# Patient Record
Sex: FEMALE | Race: WHITE | NOT HISPANIC OR LATINO | Employment: FULL TIME | URBAN - METROPOLITAN AREA
[De-identification: names, ages, dates, MRNs, and addresses within clinical notes are randomized per-mention and may not be internally consistent; named-entity substitution may affect disease eponyms.]

---

## 2019-03-01 ENCOUNTER — APPOINTMENT (OUTPATIENT)
Dept: CARDIOLOGY | Facility: HOSPITAL | Age: 49
End: 2019-03-01

## 2019-03-01 ENCOUNTER — HOSPITAL ENCOUNTER (EMERGENCY)
Facility: HOSPITAL | Age: 49
Discharge: HOME | End: 2019-03-01
Attending: EMERGENCY MEDICINE | Admitting: EMERGENCY MEDICINE

## 2019-03-01 VITALS
HEIGHT: 70 IN | HEART RATE: 83 BPM | SYSTOLIC BLOOD PRESSURE: 136 MMHG | DIASTOLIC BLOOD PRESSURE: 98 MMHG | OXYGEN SATURATION: 98 % | BODY MASS INDEX: 34.36 KG/M2 | WEIGHT: 240 LBS | RESPIRATION RATE: 16 BRPM | TEMPERATURE: 97.5 F

## 2019-03-01 DIAGNOSIS — I83.92 VARICOSE VEINS OF LEFT LOWER EXTREMITY, UNSPECIFIED WHETHER COMPLICATED: Primary | ICD-10-CM

## 2019-03-01 LAB
BASOPHILS # BLD AUTO: 0.06 10*3/MM3 (ref 0–0.2)
BASOPHILS NFR BLD AUTO: 0.8 % (ref 0–1)
BH CV LOW VAS LEFT GASTRONEMIUS VESSEL: 1
BH CV LOWER VASCULAR LEFT COMMON FEMORAL AUGMENT: NORMAL
BH CV LOWER VASCULAR LEFT COMMON FEMORAL COMPRESS: NORMAL
BH CV LOWER VASCULAR LEFT COMMON FEMORAL PHASIC: NORMAL
BH CV LOWER VASCULAR LEFT COMMON FEMORAL SPONT: NORMAL
BH CV LOWER VASCULAR LEFT DISTAL FEMORAL AUGMENT: NORMAL
BH CV LOWER VASCULAR LEFT DISTAL FEMORAL COMPRESS: NORMAL
BH CV LOWER VASCULAR LEFT DISTAL FEMORAL PHASIC: NORMAL
BH CV LOWER VASCULAR LEFT GASTRONEMIUS COMPRESS: NORMAL
BH CV LOWER VASCULAR LEFT GREATER SAPH AK COMPRESS: NORMAL
BH CV LOWER VASCULAR LEFT GREATER SAPH BK COMPRESS: NORMAL
BH CV LOWER VASCULAR LEFT LESSER SAPH COMPRESS: NORMAL
BH CV LOWER VASCULAR LEFT MID FEMORAL AUGMENT: NORMAL
BH CV LOWER VASCULAR LEFT MID FEMORAL COMPRESS: NORMAL
BH CV LOWER VASCULAR LEFT MID FEMORAL PHASIC: NORMAL
BH CV LOWER VASCULAR LEFT MID FEMORAL SPONT: NORMAL
BH CV LOWER VASCULAR LEFT POPLITEAL AUGMENT: NORMAL
BH CV LOWER VASCULAR LEFT POPLITEAL COMPRESS: NORMAL
BH CV LOWER VASCULAR LEFT POPLITEAL PHASIC: NORMAL
BH CV LOWER VASCULAR LEFT POPLITEAL SPONT: NORMAL
BH CV LOWER VASCULAR LEFT POSTERIOR TIBIAL AUGMENT: NORMAL
BH CV LOWER VASCULAR LEFT PROFUNDA FEMORAL AUGMENT: NORMAL
BH CV LOWER VASCULAR LEFT PROFUNDA FEMORAL COMPRESS: NORMAL
BH CV LOWER VASCULAR LEFT PROFUNDA FEMORAL PHASIC: NORMAL
BH CV LOWER VASCULAR LEFT PROFUNDA FEMORAL SPONT: NORMAL
BH CV LOWER VASCULAR LEFT PROXIMAL FEMORAL AUGMENT: NORMAL
BH CV LOWER VASCULAR LEFT PROXIMAL FEMORAL COMPRESS: NORMAL
BH CV LOWER VASCULAR LEFT PROXIMAL FEMORAL PHASIC: NORMAL
BH CV LOWER VASCULAR LEFT PROXIMAL FEMORAL SPONT: NORMAL
BH CV LOWER VASCULAR LEFT SAPHENOFEMORAL JUNCTION COMPRESS: NORMAL
BH CV LOWER VASCULAR LEFT SAPHENOFEMORAL JUNCTION SPONT: NORMAL
BH CV LOWER VASCULAR RIGHT COMMON FEMORAL AUGMENT: NORMAL
BH CV LOWER VASCULAR RIGHT COMMON FEMORAL COMPRESS: NORMAL
BH CV LOWER VASCULAR RIGHT COMMON FEMORAL PHASIC: NORMAL
BH CV LOWER VASCULAR RIGHT COMMON FEMORAL SPONT: NORMAL
DEPRECATED RDW RBC AUTO: 46.6 FL (ref 37–54)
EOSINOPHIL # BLD AUTO: 0.62 10*3/MM3 (ref 0–0.3)
EOSINOPHIL NFR BLD AUTO: 8.2 % (ref 0–3)
ERYTHROCYTE [DISTWIDTH] IN BLOOD BY AUTOMATED COUNT: 14.3 % (ref 11.3–14.5)
HCT VFR BLD AUTO: 36.9 % (ref 34.5–44)
HGB BLD-MCNC: 11.7 G/DL (ref 11.5–15.5)
IMM GRANULOCYTES # BLD AUTO: 0.02 10*3/MM3 (ref 0–0.05)
IMM GRANULOCYTES NFR BLD AUTO: 0.3 % (ref 0–0.6)
LYMPHOCYTES # BLD AUTO: 1.48 10*3/MM3 (ref 0.6–4.8)
LYMPHOCYTES NFR BLD AUTO: 19.5 % (ref 24–44)
MCH RBC QN AUTO: 28.4 PG (ref 27–31)
MCHC RBC AUTO-ENTMCNC: 31.7 G/DL (ref 32–36)
MCV RBC AUTO: 89.6 FL (ref 80–99)
MONOCYTES # BLD AUTO: 1.06 10*3/MM3 (ref 0–1)
MONOCYTES NFR BLD AUTO: 13.9 % (ref 0–12)
NEUTROPHILS # BLD AUTO: 4.38 10*3/MM3 (ref 1.5–8.3)
NEUTROPHILS NFR BLD AUTO: 57.6 % (ref 41–71)
PLATELET # BLD AUTO: 205 10*3/MM3 (ref 150–450)
PMV BLD AUTO: 11.4 FL (ref 6–12)
RBC # BLD AUTO: 4.12 10*6/MM3 (ref 3.89–5.14)
WBC NRBC COR # BLD: 7.6 10*3/MM3 (ref 3.5–10.8)

## 2019-03-01 PROCEDURE — 93971 EXTREMITY STUDY: CPT

## 2019-03-01 PROCEDURE — 93971 EXTREMITY STUDY: CPT | Performed by: INTERNAL MEDICINE

## 2019-03-01 PROCEDURE — 99283 EMERGENCY DEPT VISIT LOW MDM: CPT

## 2019-03-01 PROCEDURE — 85025 COMPLETE CBC W/AUTO DIFF WBC: CPT | Performed by: EMERGENCY MEDICINE

## 2019-03-01 RX ORDER — CYCLOBENZAPRINE HCL 10 MG
10 TABLET ORAL 3 TIMES DAILY PRN
COMMUNITY

## 2019-03-01 NOTE — DISCHARGE INSTRUCTIONS
Keep area bandaged until tomorrow.  When you pull the bandage off, make sure that the dressing is wet before you do so.  You may use Gelfoam and wrapping as needed if the area bleeds through.  You will need the suture removed in 7 days.  Return if continued bleeding despite the above-stated measures or symptoms worsen.

## 2019-03-01 NOTE — ED PROVIDER NOTES
Subjective   48-year-old white female complaining of bleeding left leg.  Patient states that she is not sure if she injured it on the pedal the bike several days ago or she cut herself shaving but she noticed a red spot that she picked at and it started bleeding today.  She states that it continued to soaked several bandages and has continued to bleed.  It will stop with pressure however.  She has no other complaints or concerns and states that she is never had bleeding issues in the past.  She states she is also had a little bit of pain in the proximal calf area where she thinks she may have injured herself on a bicycle.  She denies any history of blood clots, chest pain, shortness of air, or other complaints.        History provided by:  Patient  Lower Extremity Issue   Location:  Leg  Leg location:  L leg  Pain details:     Quality:  Dull (Bleeding)    Radiates to:  Does not radiate    Severity:  Mild    Onset quality:  Gradual    Timing:  Constant    Progression:  Unable to specify  Chronicity:  New  Tetanus status:  Up to date  Prior injury to area:  Yes  Relieved by: Bleeding-pressure.  Worsened by:  Nothing  Associated symptoms: no fever, no muscle weakness, no numbness and no tingling        Review of Systems   Constitutional: Negative for fever.   All other systems reviewed and are negative.      History reviewed. No pertinent past medical history.    Allergies   Allergen Reactions   • Mobic [Meloxicam] Rash       Past Surgical History:   Procedure Laterality Date   • ABDOMINOPLASTY         History reviewed. No pertinent family history.    Social History     Socioeconomic History   • Marital status: Single     Spouse name: Not on file   • Number of children: Not on file   • Years of education: Not on file   • Highest education level: Not on file   Tobacco Use   • Smoking status: Never Smoker   • Smokeless tobacco: Never Used   Substance and Sexual Activity   • Alcohol use: No     Frequency: Never            Objective   Physical Exam   Constitutional: She appears well-developed and well-nourished.   HENT:   Head: Atraumatic.   Pulmonary/Chest: Effort normal.   Musculoskeletal:   Lower extremities: Left lower extremity: At the medial aspect of the left lower extremity at the distal one third area there is a varicose vein.  Superficially, there is a small opening with no active bleeding.  Neurovascular since is intact.  There is mild calf tenderness at the proximal calf posteriorly.  Pulses 4+ at DP.  The remainder of the orthopedic exam is normal.   Neurological: She is alert.   Skin: Skin is warm and dry. Capillary refill takes less than 2 seconds. No rash noted. No erythema.   Psychiatric: She has a normal mood and affect. Her behavior is normal.   Nursing note and vitals reviewed.      Laceration Repair  Date/Time: 3/1/2019 3:24 PM  Performed by: Laci Cronin PA  Authorized by: Robi Julio MD     Consent:     Consent obtained:  Verbal    Consent given by:  Patient    Alternatives discussed:  No treatment  Anesthesia (see MAR for exact dosages):     Anesthesia method:  None  Laceration details:     Length (cm):  0.3  Repair type:     Repair type:  Simple  Pre-procedure details:     Preparation:  Patient was prepped and draped in usual sterile fashion  Exploration:     Wound extent: nerve damage and tendon damage      Tendon damage location:  Lower extremity  Treatment:     Area cleansed with:  Betadine    Amount of cleaning:  Standard  Skin repair:     Repair method:  Sutures    Suture size:  4-0    Suture technique:  Simple interrupted    Number of sutures:  1  Approximation:     Approximation:  Close    Vermilion border: well-aligned    Post-procedure details:     Patient tolerance of procedure:  Tolerated well, no immediate complications               ED Course  ED Course as of Mar 01 1526   Fri Mar 01, 2019   1502 Patient with no other complaints or concerns.  [JI]      ED Course User Index  [JI]  Laci Cronin PA        Recent Results (from the past 24 hour(s))   CBC Auto Differential    Collection Time: 03/01/19  1:24 PM   Result Value Ref Range    WBC 7.60 3.50 - 10.80 10*3/mm3    RBC 4.12 3.89 - 5.14 10*6/mm3    Hemoglobin 11.7 11.5 - 15.5 g/dL    Hematocrit 36.9 34.5 - 44.0 %    MCV 89.6 80.0 - 99.0 fL    MCH 28.4 27.0 - 31.0 pg    MCHC 31.7 (L) 32.0 - 36.0 g/dL    RDW 14.3 11.3 - 14.5 %    RDW-SD 46.6 37.0 - 54.0 fl    MPV 11.4 6.0 - 12.0 fL    Platelets 205 150 - 450 10*3/mm3    Neutrophil % 57.6 41.0 - 71.0 %    Lymphocyte % 19.5 (L) 24.0 - 44.0 %    Monocyte % 13.9 (H) 0.0 - 12.0 %    Eosinophil % 8.2 (H) 0.0 - 3.0 %    Basophil % 0.8 0.0 - 1.0 %    Immature Grans % 0.3 0.0 - 0.6 %    Neutrophils, Absolute 4.38 1.50 - 8.30 10*3/mm3    Lymphocytes, Absolute 1.48 0.60 - 4.80 10*3/mm3    Monocytes, Absolute 1.06 (H) 0.00 - 1.00 10*3/mm3    Eosinophils, Absolute 0.62 (H) 0.00 - 0.30 10*3/mm3    Basophils, Absolute 0.06 0.00 - 0.20 10*3/mm3    Immature Grans, Absolute 0.02 0.00 - 0.05 10*3/mm3   Duplex Venous Lower Extremity - Left CAR    Collection Time: 03/01/19  2:24 PM   Result Value Ref Range    Right Common Femoral Spont Y     Right Common Femoral Phasic Y     Right Common Femoral Augment Y     Right Common Femoral Compress C     Left Common Femoral Spont Y     Left Common Femoral Phasic Y     Left Common Femoral Augment Y     Left Common Femoral Compress C     Left Saphenofemoral Junction Spont Y     Left Saphenofemoral Junction Compress C     Left Profunda Femoral Spont Y     Left Profunda Femoral Phasic Y     Left Profunda Femoral Augment Y     Left Profunda Femoral Compress C     Left Proximal Femoral Spont Y     Left Proximal Femoral Phasic Y     Left Proximal Femoral Augment Y     Left Proximal Femoral Compress C     Left Mid Femoral Spont Y     Left Mid Femoral Phasic Y     Left Mid Femoral Augment Y     Left Mid Femoral Compress C     Left Distal Femoral Phasic Y     Left Distal Femoral  "Augment Y     Left Distal Femoral Compress C     Left Popliteal Spont Y     Left Popliteal Phasic Y     Left Popliteal Augment Y     Left Popliteal Compress C     Left GastronemiusSoleal Compress N     Left Greater Saph AK Compress C     Left Greater Saph BK Compress C     Left Lesser Saph Compress C     Left Posterior Tibial Augment Y     Left GastronemiusSoleal Vessel 1      Note: In addition to lab results from this visit, the labs listed above may include labs taken at another facility or during a different encounter within the last 24 hours. Please correlate lab times with ED admission and discharge times for further clarification of the services performed during this visit.    No orders to display     Vitals:    03/01/19 1253 03/01/19 1257 03/01/19 1329   BP:  111/66    Pulse:  75    Resp:  16    Temp:  97.5 °F (36.4 °C)    TempSrc:  Oral    SpO2:  99%    Weight: 109 kg (240 lb) 109 kg (240 lb) 109 kg (240 lb)   Height: 177.8 cm (70\") 177.8 cm (70\") 177.8 cm (70\")     Medications - No data to display  ECG/EMG Results (last 24 hours)     ** No results found for the last 24 hours. **        No orders to display               MDM      Final diagnoses:   Varicose veins of left lower extremity, unspecified whether complicated            Laci Cronin PA  03/01/19 1526    "

## 2023-05-11 ENCOUNTER — TRANSCRIBE ORDERS (OUTPATIENT)
Dept: NUTRITION | Facility: HOSPITAL | Age: 53
End: 2023-05-11
Payer: COMMERCIAL

## 2023-05-11 DIAGNOSIS — E66.01 MORBID OBESITY: Primary | ICD-10-CM

## 2023-05-11 DIAGNOSIS — E66.01 CLASS 3 SEVERE OBESITY DUE TO EXCESS CALORIES WITHOUT SERIOUS COMORBIDITY WITH BODY MASS INDEX (BMI) OF 50.0 TO 59.9 IN ADULT: ICD-10-CM

## 2023-07-24 ENCOUNTER — HOSPITAL ENCOUNTER (OUTPATIENT)
Dept: NUTRITION | Facility: HOSPITAL | Age: 53
Setting detail: RECURRING SERIES
Discharge: HOME | End: 2023-07-24

## 2023-07-24 PROCEDURE — 97802 MEDICAL NUTRITION INDIV IN: CPT

## 2023-07-24 NOTE — CONSULTS
[facility] Nutrition Services          Initial 60 Minute Nutrition Visit    Date: 2023   Patient Name: Kailyn Adorno  : 1970   MRN: [id number]   Referring Provider: Amandeep Zavala*    Reason for Visit: Weight Loss Education  Visit Format: In Person    Nutrition Assessment       Social History:   Social History     Socioeconomic History    Marital status: Single   Tobacco Use    Smoking status: Never    Smokeless tobacco: Never   Substance and Sexual Activity    Alcohol use: No     Active Problem List: There are no problems to display for this patient.     Current Medications:   Current Outpatient Medications:     cyclobenzaprine (FLEXERIL) 10 MG tablet, Take 10 mg by mouth 3 (Three) Times a Day As Needed for Muscle Spasms., Disp: , Rfl:     lisdexamfetamine (VYVANSE) 50 MG capsule, Take 50 mg by mouth Every Morning, Disp: , Rfl:     Labs: No labs to review.    Hunger Vital Sign Food Insecurity Assessment:  Within the past 12 months I/we worried whether our food would run out before I/we got money to buy more: No   Within the past 12 months the food I/we bought just didn't last and I/we didn't have money to get more: No   Use of food assistance programs (WIC, food stamps, food johnson) None       Food & Nutrition Related History       Food Allergies: None  Food Intolerances: None  Nutrition Impact Symptoms:  None  Gastrointestinal conditions that impact intake or food choices: None  Details at home: Lives alone  Who prepares most meals: Patient   Who does grocery shopping: Patient   How many meals are purchased from fast food/sit down restaurants per week: 3  Difficulty chewin - Normal  Difficulty swallowin - Normal  Language/communication details: English  Barriers to learning: No barriers identified at this time    24 Hour Recall:   Time Food/beverages consumed   5:00 pm Bowl of Fruit/coffee w/ a lot of creamer   12:00 am [employer] rotisserie chicken sandwich   3:00 am  "Handful of nuts/Protein Bar                         Additional comments: 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 Meals start at 5:00 pm. Patient stats she snacks throughout the day on Protein Bars, Nuts, Fruit, M&Ms, Brownies.     Anthropometrics      Height:   Ht Readings from Last 1 Encounters:   03/01/19 177.8 cm (70\")     Weight:   Wt Readings from Last 3 Encounters:   03/01/19 109 kg (240 lb)     BMI: There is no height or weight on file to calculate BMI.   Weight Change: Patient has been taking Mounjaro and has lost 30 lbs.      Physical Activity     Activity  Frequency Duration                                         Barriers to physical activity: Flat Foot     Physical activity comments: No physical activity     Estimated Needs     Estimated Energy Needs: 2,092 kcals for weight loss (activity factor included)    Discussion / Education      Pleasant 52 year old female referred here today for weight loss education. The patient had a friend with her for support. Patient states she's never seen a nutritionist before and she just wanted to come and see what types of food she should be eating. The Patient reports that she has been taking Mounjaro and has lost 30 lbs. The patient reports that she will be taking another medication in place of the Mounjaro soon. She states that she needs to get to 270 lbs to have a surgery that she needs. The patient states her biggest challenge is Portion control. She does shift work and right now she is working at nights so her first meal starts at 5:00 pm. She states she doesn't eat big meals because the Mounjaro had surpressed her appetite but she snacks all day long. The types of snacks she eats can be fruits, nuts, candy brownies, protein bars. She loves chocolate milk and drinks 16 to 24 ozs per day. She also loves coffee w/ flavored creamer and she states she uses a lot of creamer. She has started to try and drink more water. She eats out three times per " week.    Per dietary recall and interview, patient is not meeting overall nutrient needs. She is not eating balanced meals. Patient's diet is low in fruits, vegetables, whole grains, protein, low fat dairy products and essential fatty acids. Education provided was to promote safe weight loss of 0.5-2.0 lbs weight per week.    Discussion:    Consistency of meals: We discussed the importance of eating consistent, balanced meals to meet nutrient needs to promote satiety and satisfaction when eating. RD recommended patient to try and incorporate a small meal or snack in the morning and to avoid overeating and snacking throughout the day. RD explained meal patterns should be individualized from person to person. We discussed how sometimes cravings are trigger due to skipping meals and not incorporating all 3 categories of nutrients. We discussed the components of a healthy meal and snack: We discussed how the three main nutrients our bodies need are protein, carbohydrates, and fat. RD recommended the patient aim to have a source of lean protein, fiber rich carbohydrates, and heart healthy fats with each of her meals and snacks. We discussed the benefits this provides, such as meeting nutrient needs, and promoting satiety and satisfaction when eating. RD discussed Portion control and wrote out a sample menu with the patient based on the foods they would eat. RD discussed increasing Fiber and water intake. RD discussed speaking with doctor to find ways to incorporate physical activity into daily routine to aid in weight loss.    Assessment of patient engagement: Engaged    Measurement of understanding: Patient verbalized understanding    Resources Provided: [employer] Healthy Meal and Snack Ideas, High Fiber Food List, The 3 Macronutrients, Eating to Feel your Best, Quick Breakfast Ideas, Quick Meal ideas     Goal (s)      Goal 1: Eat more healthy balanced meals     Goal 2: Portion Control    Goal 3: Incorporate more physical  activity into daily routine (MD approved)     Plan of Care     PES Statement:   Overweight / Obesity related to diet and lifestye as evidenced by BMI.     Follow Up Visit      Follow Up:   Patient will call if she needs a follow-up appt.    Total of 60 minutes spent with patient on nutrition counseling. Education based on Academy of Nutrition and Dietetics guidelines. Patient was provided with RD's contact information. Thank you for this referral.